# Patient Record
Sex: FEMALE | Race: WHITE | NOT HISPANIC OR LATINO | Employment: OTHER | URBAN - METROPOLITAN AREA
[De-identification: names, ages, dates, MRNs, and addresses within clinical notes are randomized per-mention and may not be internally consistent; named-entity substitution may affect disease eponyms.]

---

## 2018-08-03 ENCOUNTER — OFFICE VISIT (OUTPATIENT)
Dept: OPHTHALMOLOGY | Facility: CLINIC | Age: 73
End: 2018-08-03
Payer: MEDICARE

## 2018-08-03 DIAGNOSIS — H33.312 RETINAL TEAR OF LEFT EYE: Primary | ICD-10-CM

## 2018-08-03 PROCEDURE — 92004 COMPRE OPH EXAM NEW PT 1/>: CPT | Mod: S$GLB,,, | Performed by: OPHTHALMOLOGY

## 2018-08-03 PROCEDURE — 67145 PROPH RTA DTCHMNT PC: CPT | Mod: LT,S$GLB,, | Performed by: OPHTHALMOLOGY

## 2018-08-03 PROCEDURE — 99999 PR PBB SHADOW E&M-NEW PATIENT-LVL II: CPT | Mod: PBBFAC,,, | Performed by: OPHTHALMOLOGY

## 2018-08-03 RX ORDER — DOXYCYCLINE 100 MG/1
CAPSULE ORAL
COMMUNITY
End: 2018-08-13

## 2018-08-03 RX ORDER — MENTHOL 5.8 MG/1
LOZENGE ORAL
COMMUNITY
Start: 2018-06-05 | End: 2018-08-13

## 2018-08-03 RX ORDER — ESTRADIOL 1 MG/1
TABLET ORAL
COMMUNITY
Start: 2018-06-20 | End: 2021-10-28 | Stop reason: SDUPTHER

## 2018-08-03 RX ORDER — ATORVASTATIN CALCIUM 10 MG/1
10 TABLET, FILM COATED ORAL
COMMUNITY

## 2018-08-03 RX ORDER — ASPIRIN 81 MG/1
TABLET ORAL
COMMUNITY
End: 2018-08-13

## 2018-08-03 NOTE — PROGRESS NOTES
===============================  08/03/2018   Brigid Bernabe,   72 y.o. female   Last visit [facility]: :Visit date not found   Last visit eye dept. Visit date not found  [facility]:  Corrected distance visual acuity was 20/20 in the right eye and 20/25 in the left eye.  Tonometry     Tonometry (Applanation, 9:53 AM)       Right Left    Pressure 15 15               Not recorded         Not recorded        Chief Complaint   Patient presents with    Eye Problem     She began having a lot of black and a large mass like area in Left eye.  She saw Dr. Gould on 7/25/18 and since then has only worsened.        HPI     Eye Problem    Additional comments: She began having a lot of black and a large mass   like area in Left eye.  She saw Dr. Gould on 7/25/18 and since then has   only worsened.           Comments   No history of surgeries, no trauma, no eye disease, no family history of   eye disease       Last edited by JANAY Raymundo MD on 8/3/2018  9:48 AM. (History)          ________________  8/3/2018  Problem List Items Addressed This Visit     None      Visit Diagnoses     Retinal tear of left eye    -  Primary    Relevant Orders    Retinopexy - OS - Left Eye        OS Floaters since 7/25/18  Much worse symptomatically   New OS [place] retinal tear and vitreous hemorrhage today  Recommend urgent laser retinopexy to OS today  Instructed to call right away with any worsening  Expect resolution of v heme over weeks  .8/3/2018  Next visit : 7-10 days in clinic po check        Laser Procedure Note    Laser: Laser retinopexy OS  ARGON  Power: 1000  Duration: 40  Interval: 450  Number: [phone]  Lens centralis  Complications: None           ===========================

## 2018-08-13 ENCOUNTER — OFFICE VISIT (OUTPATIENT)
Dept: OPHTHALMOLOGY | Facility: CLINIC | Age: 73
End: 2018-08-13
Payer: MEDICARE

## 2018-08-13 DIAGNOSIS — Z98.890 POST-OPERATIVE STATE: Primary | ICD-10-CM

## 2018-08-13 PROCEDURE — 99999 PR PBB SHADOW E&M-EST. PATIENT-LVL II: CPT | Mod: PBBFAC,,, | Performed by: OPHTHALMOLOGY

## 2018-08-13 PROCEDURE — 99024 POSTOP FOLLOW-UP VISIT: CPT | Mod: S$GLB,,, | Performed by: OPHTHALMOLOGY

## 2019-09-20 ENCOUNTER — TELEPHONE (OUTPATIENT)
Dept: OPHTHALMOLOGY | Facility: CLINIC | Age: 74
End: 2019-09-20

## 2019-09-20 NOTE — TELEPHONE ENCOUNTER
----- Message from Ayo Brown sent at 9/20/2019  4:22 PM CDT -----  Contact: pt   Pt is calling staff regarding the pt stating that pt have flashes before the eyes.    Pt call back [phone]    Thanks

## 2019-09-20 NOTE — TELEPHONE ENCOUNTER
----- Message from Ayo Brown sent at 9/20/2019  4:22 PM CDT -----  Contact: pt   Pt is calling staff regarding the pt stating that pt have flashes before the eyes.    Pt call back [phone]    Thanks

## 2019-09-20 NOTE — TELEPHONE ENCOUNTER
Spoke with pt.  She is having flashing lights OD that started today at about 2:00.  No curtain, veil, or shower of floaters.  History of retinopexy OS 08/18.  Gave pt Dr. Raymundo's cell [phone].

## 2019-09-23 ENCOUNTER — OFFICE VISIT (OUTPATIENT)
Dept: OPHTHALMOLOGY | Facility: CLINIC | Age: 74
End: 2019-09-23
Payer: MEDICARE

## 2019-09-23 DIAGNOSIS — H43.811 POSTERIOR VITREOUS DETACHMENT OF RIGHT EYE: Primary | ICD-10-CM

## 2019-09-23 PROCEDURE — 99999 PR PBB SHADOW E&M-EST. PATIENT-LVL II: CPT | Mod: PBBFAC,,, | Performed by: OPHTHALMOLOGY

## 2019-09-23 PROCEDURE — 92012 INTRM OPH EXAM EST PATIENT: CPT | Mod: S$GLB,,, | Performed by: OPHTHALMOLOGY

## 2019-09-23 PROCEDURE — 92012 PR EYE EXAM, EST PATIENT,INTERMED: ICD-10-PCS | Mod: S$GLB,,, | Performed by: OPHTHALMOLOGY

## 2019-09-23 PROCEDURE — 99999 PR PBB SHADOW E&M-EST. PATIENT-LVL II: ICD-10-PCS | Mod: PBBFAC,,, | Performed by: OPHTHALMOLOGY

## 2019-09-23 NOTE — PROGRESS NOTES
===============================  Brigid Bernabe,   73 y.o. female   Last visit [facility]: :8/13/2018   Last visit eye dept. Visit date not found  [facility]:  Corrected distance visual acuity was 20/25 in the right eye and not recorded in the left eye. Corrected near visual acuity was not recorded in the right eye and J1 in the left eye.  Tonometry     Tonometry (Applanation, 1:03 PM)       Right Left    Pressure 13 15               Not recorded         Not recorded         Not recorded        Chief Complaint   Patient presents with    flashes of light     started seeing flashes of light od 3 days ago          ________________  9/23/2019  HPI     flashes of light      Additional comments: started seeing flashes of light od 3 days ago              Comments     LASER RETINOPEXY OS 8/3/18          Last edited by SATNAM Davis on 9/23/2019 12:59 PM. (History)      Problem List Items Addressed This Visit     None      Visit Diagnoses     Posterior vitreous detachment of right eye    -  Primary          .no holes or tears in retina  Reviewed s/s RT, RD  Instructed to call with any worsening  rtc 1 year       ===========================

## 2024-11-15 ENCOUNTER — OFFICE VISIT (OUTPATIENT)
Facility: CLINIC | Age: 79
End: 2024-11-15
Payer: MEDICARE

## 2024-11-15 VITALS
HEART RATE: 62 BPM | RESPIRATION RATE: 16 BRPM | WEIGHT: 126.13 LBS | DIASTOLIC BLOOD PRESSURE: 78 MMHG | SYSTOLIC BLOOD PRESSURE: 146 MMHG | BODY MASS INDEX: 22.34 KG/M2

## 2024-11-15 DIAGNOSIS — Z87.442 PERSONAL HISTORY OF URINARY CALCULI: ICD-10-CM

## 2024-11-15 DIAGNOSIS — N32.81 OVERACTIVE BLADDER: Primary | ICD-10-CM

## 2024-11-15 DIAGNOSIS — R35.1 NOCTURIA: ICD-10-CM

## 2024-11-15 PROCEDURE — 99999 PR PBB SHADOW E&M-EST. PATIENT-LVL III: CPT | Mod: PBBFAC,,, | Performed by: UROLOGY

## 2024-11-15 RX ORDER — OXYBUTYNIN CHLORIDE 5 MG/1
5 TABLET, EXTENDED RELEASE ORAL DAILY
Qty: 90 TABLET | Refills: 3 | Status: SHIPPED | OUTPATIENT
Start: 2024-11-15 | End: 2025-11-15

## 2024-11-15 NOTE — PROGRESS NOTES
Subjective:       Patient ID: Brigid Bernabe is a 78 y.o. female.    Chief Complaint: No chief complaint on file.      History of Present Illness:     Ms Bernabe is having daytime urinary frequency and urgency.  She says she drinks 2 cups of coffee daily.  She says she drinks mostly water.  Nocturia X 2.  No urinary incontinence.  No gross hematuria.  No dysuria.  No flank pain.  She has a history of kidney stones.  She had a history of a USE with ureteral stent placement and removal by me several years ago.  No problems with constipation.         Past Medical History:   Diagnosis Date    Diverticulitis      Family History   Problem Relation Name Age of Onset    No Known Problems Mother      No Known Problems Father      Breast cancer Sister      No Known Problems Brother      No Known Problems Maternal Aunt      No Known Problems Maternal Uncle      No Known Problems Paternal Aunt      No Known Problems Paternal Uncle      No Known Problems Maternal Grandmother      No Known Problems Maternal Grandfather      No Known Problems Paternal Grandmother      No Known Problems Paternal Grandfather      Amblyopia Neg Hx      Blindness Neg Hx      Cancer Neg Hx      Cataracts Neg Hx      Diabetes Neg Hx      Glaucoma Neg Hx      Hypertension Neg Hx      Macular degeneration Neg Hx      Retinal detachment Neg Hx      Strabismus Neg Hx      Stroke Neg Hx      Thyroid disease Neg Hx       Social History     Socioeconomic History    Marital status:    Tobacco Use    Smoking status: Never    Smokeless tobacco: Never   Substance and Sexual Activity    Alcohol use: No    Drug use: No    Sexual activity: Not Currently     Partners: Male     Outpatient Encounter Medications as of 11/15/2024   Medication Sig Dispense Refill    atorvastatin (LIPITOR) 10 MG tablet Take 10 mg by mouth.      estradioL (ESTRACE) 1 MG tablet Take 1 tablet (1 mg total) by mouth once daily. 90 tablet 3    omeprazole (PRILOSEC) 20 MG capsule        oxybutynin (DITROPAN-XL) 5 MG TR24 Take 1 tablet (5 mg total) by mouth once daily. 90 tablet 3     No facility-administered encounter medications on file as of 11/15/2024.        Review of Systems   Constitutional:  Negative for chills and fever.   Respiratory:  Negative for shortness of breath.    Cardiovascular:  Negative for chest pain.   Gastrointestinal:  Negative for nausea and vomiting.   Genitourinary:  Positive for frequency and urgency. Negative for difficulty urinating, dysuria, flank pain and hematuria.   Musculoskeletal:  Negative for back pain.   Skin:  Negative for rash.   Neurological:  Negative for dizziness.   Psychiatric/Behavioral:  Negative for agitation.        Objective:     BP (!) 146/78 (Patient Position: Sitting)   Pulse 62   Resp 16   Wt 57.2 kg (126 lb 1.7 oz)   BMI 22.34 kg/m²     Physical Exam  Constitutional:       General: She is not in acute distress.  Pulmonary:      Effort: Pulmonary effort is normal.   Abdominal:      General: There is no distension.      Palpations: Abdomen is soft.   Neurological:      Mental Status: She is alert and oriented to person, place, and time.         No visits with results within 1 Day(s) from this visit.   Latest known visit with results is:   Office Visit on 01/12/2023   Component Date Value Ref Range Status    DIAGNOSIS: 01/12/2023 Comment   Final    NEGATIVE FOR INTRAEPITHELIAL LESION OR MALIGNANCY.    Specimen adequacy: 01/12/2023 Comment   Final    Satisfactory for evaluation. No endocervical component is identified.    Clinician Provided ICD10 01/12/2023 Comment   Final    Comment: Z01.419  Z12.4      Performed by: 01/12/2023 Comment   Final    Yolanda Rangel, Cytotechnologist ([facility])    . 01/12/2023 .   Final    Note: 01/12/2023 Comment   Final    Comment: The Pap smear is a screening test designed to aid in the detection of  premalignant and malignant conditions of the uterine cervix.  It is not a  diagnostic procedure and  should not be used as the sole means of detecting  cervical cancer.  Both false-positive and false-negative reports do occur.      Test Methodology: 01/12/2023 Comment   Final    Comment: This liquid based ThinPrep(R) pap test was screened with the  use of an image guided system.      . 01/12/2023 Comment   Final    Comment: The HPV DNA reflex criteria were not met with this specimen result  therefore, no HPV testing was performed.          No results found for this or any previous visit (from the past 8760 hours).     Assessment:       1. Overactive bladder    2. Nocturia    3. Personal history of urinary calculi      Plan:     Orders Placed This Encounter    X-Ray KUB    US Retroperitoneal Complete    oxybutynin (DITROPAN-XL) 5 MG TR24          Assessment:  - OAB and nocturia.  - History of kidney stones.  History of a USE with ureteral stent placement and removal by me several years ago.    Plan:  - Started oxybutynin 5 mg ER 1 PO qdaily today on 11-15-24.  - I discussed dietary modifications with her today and I recommended she drink mostly water during the day.  - I recommended she limit her fluid intake at night to small amounts of water and to void just prior to bedtime.    - KUB and renal ultrasound in 1 year a few days prior to a 1 year appointment with a PVR on arrival.

## 2025-08-07 ENCOUNTER — TELEPHONE (OUTPATIENT)
Dept: UROLOGY | Facility: CLINIC | Age: 80
End: 2025-08-07
Payer: MEDICARE

## 2025-08-07 NOTE — TELEPHONE ENCOUNTER
Returned call; appt scheduled.  Ignacia Flood LPN  Copied from CRM #[id number]. Topic: Appointments - Appointment Access  >> Aug 7, 2025  9:27 AM Selene wrote:  Type:  Sooner Apoointment Request    Caller is requesting a sooner appointment.  Caller declined first available appointment listed below.  Caller will not accept being placed on the waitlist and is requesting a message be sent to doctor.  Name of Caller:Brigid   When is the first available appointment?pt is scheduled for 9/22/25  Symptoms:kidney stone concern  Would the patient rather a call back or a response via [facility]? call  Best Call Back Number:[phone]     Additional Information:

## 2025-08-07 NOTE — TELEPHONE ENCOUNTER
Returned call to pt; appt scheduled.  Ignacia Flood LPN  Copied from CRM #[id number]. Topic: Appointments - Appointment Access  >> Aug 7, 2025  9:27 AM Selene wrote:  Type:  Sooner Apoointment Request    Caller is requesting a sooner appointment.  Caller declined first available appointment listed below.  Caller will not accept being placed on the waitlist and is requesting a message be sent to doctor.  Name of Caller:Brigid   When is the first available appointment?pt is scheduled for 9/22/25  Symptoms:kidney stone concern  Would the patient rather a call back or a response via [facility]? call  Best Call Back Number:[phone]     Additional Information:

## 2025-08-07 NOTE — TELEPHONE ENCOUNTER
Duplicate encounter      Copied from CRM #[id number]. Topic: Appointments - Appointment Access  >> Aug 7, 2025  9:27 AM Selene wrote:  Type:  Sooner Apoointment Request    Caller is requesting a sooner appointment.  Caller declined first available appointment listed below.  Caller will not accept being placed on the waitlist and is requesting a message be sent to doctor.  Name of Caller:Brigid   When is the first available appointment?pt is scheduled for 9/22/25  Symptoms:kidney stone concern  Would the patient rather a call back or a response via [facility]? call  Best Call Back Number:[phone]     Additional Information:

## 2025-08-07 NOTE — TELEPHONE ENCOUNTER
Duplicate encounter.  Ignacia Flood LPN      Copied from CRM #[id number]. Topic: Appointments - Appointment Access  >> Aug 7, 2025  9:27 AM Selene wrote:  Type:  Sooner Apoointment Request    Caller is requesting a sooner appointment.  Caller declined first available appointment listed below.  Caller will not accept being placed on the waitlist and is requesting a message be sent to doctor.  Name of Caller:Brigid   When is the first available appointment?pt is scheduled for 9/22/25  Symptoms:kidney stone concern  Would the patient rather a call back or a response via [facility]? call  Best Call Back Number:[phone]     Additional Information:

## 2025-08-18 ENCOUNTER — OFFICE VISIT (OUTPATIENT)
Facility: CLINIC | Age: 80
End: 2025-08-18
Payer: MEDICARE

## 2025-08-18 VITALS — DIASTOLIC BLOOD PRESSURE: 84 MMHG | HEART RATE: 65 BPM | RESPIRATION RATE: 16 BRPM | SYSTOLIC BLOOD PRESSURE: 159 MMHG

## 2025-08-18 DIAGNOSIS — N32.81 OVERACTIVE BLADDER: ICD-10-CM

## 2025-08-18 DIAGNOSIS — R35.1 NOCTURIA: ICD-10-CM

## 2025-08-18 DIAGNOSIS — R10.32 LEFT LOWER QUADRANT ABDOMINAL PAIN: ICD-10-CM

## 2025-08-18 DIAGNOSIS — N20.0 KIDNEY STONE: Primary | ICD-10-CM

## 2025-08-18 PROCEDURE — 99214 OFFICE O/P EST MOD 30 MIN: CPT | Mod: S$GLB,,, | Performed by: UROLOGY

## 2025-08-18 PROCEDURE — 1126F AMNT PAIN NOTED NONE PRSNT: CPT | Mod: CPTII,S$GLB,, | Performed by: UROLOGY

## 2025-08-18 PROCEDURE — 3077F SYST BP >= 140 MM HG: CPT | Mod: CPTII,S$GLB,, | Performed by: UROLOGY

## 2025-08-18 PROCEDURE — 3079F DIAST BP 80-89 MM HG: CPT | Mod: CPTII,S$GLB,, | Performed by: UROLOGY

## 2025-08-18 PROCEDURE — 3288F FALL RISK ASSESSMENT DOCD: CPT | Mod: CPTII,S$GLB,, | Performed by: UROLOGY

## 2025-08-18 PROCEDURE — 1101F PT FALLS ASSESS-DOCD LE1/YR: CPT | Mod: CPTII,S$GLB,, | Performed by: UROLOGY

## 2025-08-18 PROCEDURE — 99999 PR PBB SHADOW E&M-EST. PATIENT-LVL III: CPT | Mod: PBBFAC,,, | Performed by: UROLOGY

## 2025-08-18 PROCEDURE — 1159F MED LIST DOCD IN RCRD: CPT | Mod: CPTII,S$GLB,, | Performed by: UROLOGY

## 2025-08-18 RX ORDER — ASPIRIN 325 MG
TABLET, DELAYED RELEASE (ENTERIC COATED) ORAL
COMMUNITY
Start: 2024-05-08

## 2025-08-18 RX ORDER — AZELASTINE 1 MG/ML
SPRAY, METERED NASAL
COMMUNITY